# Patient Record
Sex: MALE | URBAN - METROPOLITAN AREA
[De-identification: names, ages, dates, MRNs, and addresses within clinical notes are randomized per-mention and may not be internally consistent; named-entity substitution may affect disease eponyms.]

---

## 2020-03-26 ENCOUNTER — APPOINTMENT (OUTPATIENT)
Age: 75
Setting detail: DERMATOLOGY
End: 2020-03-29

## 2020-03-26 VITALS — HEIGHT: 71 IN | WEIGHT: 197 LBS | TEMPERATURE: 97.3 F | RESPIRATION RATE: 18 BRPM

## 2020-03-26 DIAGNOSIS — L28.1 PRURIGO NODULARIS: ICD-10-CM

## 2020-03-26 PROCEDURE — OTHER MIPS QUALITY: OTHER

## 2020-03-26 PROCEDURE — 99202 OFFICE O/P NEW SF 15 MIN: CPT

## 2020-03-26 PROCEDURE — OTHER TREATMENT REGIMEN: OTHER

## 2020-03-26 PROCEDURE — OTHER MEDICATION COUNSELING: OTHER

## 2020-03-26 PROCEDURE — OTHER PRESCRIPTION: OTHER

## 2020-03-26 PROCEDURE — OTHER COUNSELING: OTHER

## 2020-03-26 RX ORDER — TRIAMCINOLONE ACETONIDE 1 MG/G
CREAM TOPICAL BID
Qty: 1 | Refills: 2 | Status: ERX | COMMUNITY
Start: 2020-03-26

## 2020-03-26 ASSESSMENT — LOCATION ZONE DERM
LOCATION ZONE: ARM
LOCATION ZONE: TRUNK
LOCATION ZONE: LEG

## 2020-03-26 ASSESSMENT — LOCATION DETAILED DESCRIPTION DERM
LOCATION DETAILED: LEFT DISTAL CALF
LOCATION DETAILED: LEFT ANTERIOR DISTAL THIGH
LOCATION DETAILED: RIGHT PROXIMAL DORSAL FOREARM
LOCATION DETAILED: RIGHT DISTAL DORSAL FOREARM
LOCATION DETAILED: RIGHT INFERIOR UPPER BACK
LOCATION DETAILED: LEFT POPLITEAL SKIN

## 2020-03-26 ASSESSMENT — LOCATION SIMPLE DESCRIPTION DERM
LOCATION SIMPLE: LEFT CALF
LOCATION SIMPLE: RIGHT FOREARM
LOCATION SIMPLE: LEFT THIGH
LOCATION SIMPLE: LEFT POPLITEAL SKIN
LOCATION SIMPLE: RIGHT UPPER BACK

## 2020-03-26 NOTE — PROCEDURE: MEDICATION COUNSELING
Xelarniez Pregnancy And Lactation Text: This medication is Pregnancy Category D and is not considered safe during pregnancy.  The risk during breast feeding is also uncertain.

## 2020-03-26 NOTE — PROCEDURE: TREATMENT REGIMEN
Otc Regimen: Silver Gel, apply to affected areas of open sores daily until resolved (samples given) \\nCetaphil moisturizing lotion, apply to body all over daily. (Trade size given)
Plan: Multiple lesions treated with LN2 on the arms, legs, and back, patient tolerated well (benjamin) \\nPatient instructed not to manipulate lesions as this will extend healing time \\nReturn to clinic in 6 weeks for a follow-up visit
Detail Level: Zone
Initiate Treatment: triamcinolone acetonide 0.1 % topical cream, Apply to affected areas of rash on trunk and extremities BID x2 weeks, stop x1 week. Repeat as needed

## 2020-05-20 ENCOUNTER — APPOINTMENT (OUTPATIENT)
Age: 75
Setting detail: DERMATOLOGY
End: 2020-05-21

## 2020-05-20 VITALS — RESPIRATION RATE: 18 BRPM | WEIGHT: 185 LBS | HEIGHT: 71 IN | TEMPERATURE: 98.1 F

## 2020-05-20 DIAGNOSIS — L21.8 OTHER SEBORRHEIC DERMATITIS: ICD-10-CM

## 2020-05-20 DIAGNOSIS — L28.1 PRURIGO NODULARIS: ICD-10-CM

## 2020-05-20 PROCEDURE — OTHER PRESCRIPTION: OTHER

## 2020-05-20 PROCEDURE — OTHER MEDICATION COUNSELING: OTHER

## 2020-05-20 PROCEDURE — OTHER TREATMENT REGIMEN: OTHER

## 2020-05-20 PROCEDURE — OTHER COUNSELING: OTHER

## 2020-05-20 PROCEDURE — 99213 OFFICE O/P EST LOW 20 MIN: CPT

## 2020-05-20 RX ORDER — KETOCONAZOLE 20 MG/ML
SHAMPOO TOPICAL BIW
Qty: 1 | Refills: 11 | Status: ERX | COMMUNITY
Start: 2020-05-20

## 2020-05-20 RX ORDER — TRIAMCINOLONE ACETONIDE 1 MG/G
CREAM TOPICAL BID
Qty: 1 | Refills: 2 | Status: ACTIVE

## 2020-05-20 ASSESSMENT — LOCATION ZONE DERM
LOCATION ZONE: SCALP
LOCATION ZONE: LEG
LOCATION ZONE: ARM
LOCATION ZONE: TRUNK

## 2020-05-20 ASSESSMENT — LOCATION SIMPLE DESCRIPTION DERM
LOCATION SIMPLE: LEFT THIGH
LOCATION SIMPLE: LEFT CALF
LOCATION SIMPLE: RIGHT UPPER BACK
LOCATION SIMPLE: RIGHT FOREARM
LOCATION SIMPLE: POSTERIOR SCALP
LOCATION SIMPLE: LEFT POPLITEAL SKIN

## 2020-05-20 ASSESSMENT — LOCATION DETAILED DESCRIPTION DERM
LOCATION DETAILED: LEFT ANTERIOR DISTAL THIGH
LOCATION DETAILED: RIGHT DISTAL DORSAL FOREARM
LOCATION DETAILED: LEFT DISTAL CALF
LOCATION DETAILED: LEFT POPLITEAL SKIN
LOCATION DETAILED: RIGHT INFERIOR UPPER BACK
LOCATION DETAILED: POSTERIOR MID-PARIETAL SCALP
LOCATION DETAILED: RIGHT PROXIMAL DORSAL FOREARM

## 2020-05-20 NOTE — PROCEDURE: MEDICATION COUNSELING
Xelarniez Pregnancy And Lactation Text: This medication is Pregnancy Category D and is not considered safe during pregnancy.  The risk during breast feeding is also uncertain. [id number]

## 2020-05-20 NOTE — PROCEDURE: TREATMENT REGIMEN
Otc Regimen: continue moisturizer daily; discontinue scratching
Plan: Return to clinic as needed.
Detail Level: Zone
Plan: RTC as needed; lesions healing, no sites actively infected.
Initiate Treatment: Ketoconazole shampoo, lather into scalp and chest, let sit 10-15min, rinse; use up to 3x weekly